# Patient Record
Sex: FEMALE | Race: WHITE | NOT HISPANIC OR LATINO | Employment: UNEMPLOYED | URBAN - METROPOLITAN AREA
[De-identification: names, ages, dates, MRNs, and addresses within clinical notes are randomized per-mention and may not be internally consistent; named-entity substitution may affect disease eponyms.]

---

## 2017-02-15 ENCOUNTER — LAB REQUISITION (OUTPATIENT)
Dept: LAB | Facility: HOSPITAL | Age: 37
End: 2017-02-15
Payer: COMMERCIAL

## 2017-02-15 ENCOUNTER — ALLSCRIPTS OFFICE VISIT (OUTPATIENT)
Dept: OTHER | Facility: OTHER | Age: 37
End: 2017-02-15

## 2017-02-15 DIAGNOSIS — J02.9 ACUTE PHARYNGITIS: ICD-10-CM

## 2017-02-15 LAB — S PYO AG THROAT QL: NEGATIVE

## 2017-02-15 PROCEDURE — 87430 STREP A AG IA: CPT | Performed by: FAMILY MEDICINE

## 2017-02-15 PROCEDURE — 87070 CULTURE OTHR SPECIMN AEROBIC: CPT | Performed by: FAMILY MEDICINE

## 2017-02-17 LAB — BACTERIA THROAT CULT: NORMAL

## 2017-07-12 ENCOUNTER — ALLSCRIPTS OFFICE VISIT (OUTPATIENT)
Dept: OTHER | Facility: OTHER | Age: 37
End: 2017-07-12

## 2018-01-12 VITALS
OXYGEN SATURATION: 98 % | DIASTOLIC BLOOD PRESSURE: 88 MMHG | WEIGHT: 126 LBS | SYSTOLIC BLOOD PRESSURE: 121 MMHG | HEART RATE: 81 BPM | BODY MASS INDEX: 24.74 KG/M2 | RESPIRATION RATE: 18 BRPM | TEMPERATURE: 98.9 F | HEIGHT: 60 IN

## 2018-01-14 VITALS
BODY MASS INDEX: 24.54 KG/M2 | HEIGHT: 60 IN | RESPIRATION RATE: 18 BRPM | OXYGEN SATURATION: 99 % | WEIGHT: 125 LBS | HEART RATE: 92 BPM | TEMPERATURE: 98.4 F | SYSTOLIC BLOOD PRESSURE: 108 MMHG | DIASTOLIC BLOOD PRESSURE: 76 MMHG

## 2018-05-18 ENCOUNTER — OFFICE VISIT (OUTPATIENT)
Dept: URGENT CARE | Facility: CLINIC | Age: 38
End: 2018-05-18
Payer: COMMERCIAL

## 2018-05-18 VITALS
OXYGEN SATURATION: 98 % | SYSTOLIC BLOOD PRESSURE: 98 MMHG | RESPIRATION RATE: 16 BRPM | WEIGHT: 119.8 LBS | HEIGHT: 61 IN | BODY MASS INDEX: 22.62 KG/M2 | TEMPERATURE: 97 F | DIASTOLIC BLOOD PRESSURE: 60 MMHG | HEART RATE: 76 BPM

## 2018-05-18 DIAGNOSIS — J02.9 SORE THROAT: ICD-10-CM

## 2018-05-18 DIAGNOSIS — J01.00 ACUTE NON-RECURRENT MAXILLARY SINUSITIS: Primary | ICD-10-CM

## 2018-05-18 LAB — S PYO AG THROAT QL: NEGATIVE

## 2018-05-18 PROCEDURE — 99213 OFFICE O/P EST LOW 20 MIN: CPT | Performed by: FAMILY MEDICINE

## 2018-05-18 PROCEDURE — 87880 STREP A ASSAY W/OPTIC: CPT | Performed by: FAMILY MEDICINE

## 2018-05-18 RX ORDER — FLUTICASONE PROPIONATE 50 MCG
1 SPRAY, SUSPENSION (ML) NASAL DAILY
Qty: 1 BOTTLE | Refills: 0 | Status: SHIPPED | OUTPATIENT
Start: 2018-05-18 | End: 2022-08-06 | Stop reason: ALTCHOICE

## 2018-05-18 RX ORDER — AMOXICILLIN AND CLAVULANATE POTASSIUM 875; 125 MG/1; MG/1
1 TABLET, FILM COATED ORAL EVERY 12 HOURS SCHEDULED
Qty: 20 TABLET | Refills: 0 | Status: SHIPPED | OUTPATIENT
Start: 2018-05-18 | End: 2018-05-28

## 2018-05-18 NOTE — PATIENT INSTRUCTIONS
Acute Sinusitis  - rapid strep test is negative   - Augmentin prescribed, complete as directed   - Flonase nasal spray prescribed to help w/ sinus pain and pressure   - should rest and drink plenty of fluids   - take Tylenol or Motrin as needed   - run a humidifier at home  - if symptoms persist despite treatment or worsen, follow up w/ pcp for re-check

## 2018-05-18 NOTE — PROGRESS NOTES
[facility]        NAME: Ivan Mondragon is a 30 y o  female  : 1980    MRN: [id number]  DATE: May 18, 2018  TIME: 4:53 PM    Assessment and Plan   Acute non-recurrent maxillary sinusitis [J01 00]  1  Acute non-recurrent maxillary sinusitis  amoxicillin-clavulanate (AUGMENTIN) 875-125 mg per tablet    fluticasone (FLONASE) 50 mcg/act nasal spray   2  Sore throat  POCT rapid strepA    CANCELED: POCT rapid strepA         Patient Instructions     Patient Instructions   Acute Sinusitis  - rapid strep test is negative   - Augmentin prescribed, complete as directed   - Flonase nasal spray prescribed to help w/ sinus pain and pressure   - should rest and drink plenty of fluids   - take Tylenol or Motrin as needed   - run a humidifier at home  - if symptoms persist despite treatment or worsen, follow up w/ pcp for re-check       Follow up with PCP in 3-5 days  Proceed to  ER if symptoms worsen  Chief Complaint     Chief Complaint   Patient presents with    Sinusitis     Began Wednesday with sore throat  and temp 100 7 then 101 this morning  Now very painful sinuses  Medicated with Motrin 3 tabs without relief  History of Present Illness       29 yo female presents c/o sinus pain and pressure, sore throat, nasal congestion, nasal drainage, and fever x 3 days  Had a fever of 100 7 last night, and 101 1 this morning  No ear pain  No cough or SOB  No chest pain  No GI sx  No skin rashes  No known allergies  No recent travel  Has been taking Motrin as needed  Rapid strep test is negative  Review of Systems   Review of Systems   Constitutional:        As noted in HPI   HENT:        As noted in HPI   Eyes: Negative  Respiratory: Negative  Cardiovascular: Negative  Gastrointestinal: Negative  Musculoskeletal: Negative  Skin: Negative            Current Medications       Current Outpatient Prescriptions:     amoxicillin-clavulanate (AUGMENTIN) 875-125 mg per tablet, Take 1 tablet by mouth every 12 (twelve) hours for 10 days, Disp: 20 tablet, Rfl: 0    fluticasone (FLONASE) 50 mcg/act nasal spray, 1 spray into each nostril daily, Disp: 1 Bottle, Rfl: 0    Current Allergies     Allergies as of 05/18/2018    (No Known Allergies)            The following portions of the patient's history were reviewed and updated as appropriate: allergies, current medications, past family history, past medical history, past social history, past surgical history and problem list      History reviewed  No pertinent past medical history  Past Surgical History:   Procedure Laterality Date    BREAST SURGERY         Family History   Problem Relation Age of Onset    No Known Problems Mother     No Known Problems Father          Medications have been verified  Objective   BP 98/60   Pulse 76   Temp (!) 97 °F (36 1 °C)   Resp 16   Ht 5' 1" (1 549 m)   Wt 54 3 kg (119 lb 12 8 oz)   SpO2 98%   BMI 22 64 kg/m²        Physical Exam     Physical Exam   Constitutional: She is oriented to person, place, and time  Vital signs are normal  She appears well-developed and well-nourished  She is active and cooperative  Non-toxic appearance  She does not have a sickly appearance  She does not appear ill  No distress  HENT:   Head: Normocephalic and atraumatic  Right Ear: Tympanic membrane, external ear and ear canal normal    Left Ear: Tympanic membrane, external ear and ear canal normal    Nose: Rhinorrhea present  Right sinus exhibits maxillary sinus tenderness  Right sinus exhibits no frontal sinus tenderness  Left sinus exhibits maxillary sinus tenderness  Left sinus exhibits no frontal sinus tenderness  Mouth/Throat: Uvula is midline and mucous membranes are normal  Posterior oropharyngeal erythema present  No oropharyngeal exudate, posterior oropharyngeal edema or tonsillar abscesses  Eyes: Conjunctivae and EOM are normal  Pupils are equal, round, and reactive to light     Neck: Normal range of motion  Neck supple  Pulmonary/Chest: Effort normal and breath sounds normal    Lymphadenopathy:     She has no cervical adenopathy  Neurological: She is alert and oriented to person, place, and time  Skin: She is not diaphoretic  Psychiatric: She has a normal mood and affect  Her behavior is normal  Judgment and thought content normal    Nursing note and vitals reviewed

## 2022-08-06 ENCOUNTER — OFFICE VISIT (OUTPATIENT)
Dept: URGENT CARE | Facility: CLINIC | Age: 42
End: 2022-08-06
Payer: COMMERCIAL

## 2022-08-06 VITALS — TEMPERATURE: 98 F | HEART RATE: 78 BPM | OXYGEN SATURATION: 98 % | RESPIRATION RATE: 14 BRPM

## 2022-08-06 DIAGNOSIS — R39.9 UTI SYMPTOMS: Primary | ICD-10-CM

## 2022-08-06 LAB
SL AMB  POCT GLUCOSE, UA: ABNORMAL
SL AMB LEUKOCYTE ESTERASE,UA: ABNORMAL
SL AMB POCT BILIRUBIN,UA: ABNORMAL
SL AMB POCT BLOOD,UA: ABNORMAL
SL AMB POCT CLARITY,UA: ABNORMAL
SL AMB POCT COLOR,UA: YELLOW
SL AMB POCT KETONES,UA: ABNORMAL
SL AMB POCT NITRITE,UA: ABNORMAL
SL AMB POCT PH,UA: 7.5
SL AMB POCT SPECIFIC GRAVITY,UA: 1
SL AMB POCT URINE PROTEIN: ABNORMAL
SL AMB POCT UROBILINOGEN: 0.2

## 2022-08-06 PROCEDURE — 81002 URINALYSIS NONAUTO W/O SCOPE: CPT | Performed by: PHYSICIAN ASSISTANT

## 2022-08-06 PROCEDURE — 87086 URINE CULTURE/COLONY COUNT: CPT | Performed by: PHYSICIAN ASSISTANT

## 2022-08-06 PROCEDURE — 87186 SC STD MICRODIL/AGAR DIL: CPT | Performed by: PHYSICIAN ASSISTANT

## 2022-08-06 PROCEDURE — 87077 CULTURE AEROBIC IDENTIFY: CPT | Performed by: PHYSICIAN ASSISTANT

## 2022-08-06 PROCEDURE — 99213 OFFICE O/P EST LOW 20 MIN: CPT | Performed by: PHYSICIAN ASSISTANT

## 2022-08-06 RX ORDER — NITROFURANTOIN 25; 75 MG/1; MG/1
100 CAPSULE ORAL 2 TIMES DAILY
Qty: 14 CAPSULE | Refills: 0 | Status: SHIPPED | OUTPATIENT
Start: 2022-08-06 | End: 2022-08-13

## 2022-08-06 NOTE — PATIENT INSTRUCTIONS
Take antibiotic as prescribed for suspected UTI, will send urine for culture  Recommend OTC ibuprofen/tylenol as needed for discomfort  Follow up with PCP  Return or be seen in ER with any worsening symptoms  Patient understands and is agreeable with this plan

## 2022-08-06 NOTE — PROGRESS NOTES
[facility]        NAME: Monica Grande is a 46 y o  female  : 1980    MRN: [id number]  DATE: 2022  TIME: 12:18 PM    Assessment and Plan   UTI symptoms [R39 9]  1  UTI symptoms  POCT urine dip    Urine culture    nitrofurantoin (MACROBID) 100 mg capsule         Patient Instructions   Patient Instructions   Take antibiotic as prescribed for suspected UTI, will send urine for culture  Recommend OTC ibuprofen/tylenol as needed for discomfort  Follow up with PCP  Return or be seen in ER with any worsening symptoms  Patient understands and is agreeable with this plan  Follow up with PCP in 3-5 days  Proceed to  ER if symptoms worsen  Chief Complaint     Chief Complaint   Patient presents with    Possible UTI     Pt presents with urinary urgency, painful urination; started two days ago; Hx of kidney stones, UTI         History of Present Illness       Patient is a 81-year-old female presenting today with UTI symptoms times 2 days  Patient notes a couple days ago she was experiencing some dysuria, notes she is now also experiencing increased frequency urination and sensation of urgency, believe she may have a UTI, has not taken any medications or alleviating factors for symptoms  Denies fever, chills, flank pain, hematuria, abdominal pain, vaginal discharge or drainage  Denies possibility of pregnancy  Review of Systems   Review of Systems   Constitutional: Negative for chills and fever  HENT: Negative for sore throat  Respiratory: Negative for cough  Cardiovascular: Negative for chest pain  Gastrointestinal: Negative for abdominal pain, diarrhea, nausea and vomiting  Genitourinary: Positive for dysuria, frequency and urgency  Negative for flank pain and hematuria  Musculoskeletal: Negative for arthralgias and myalgias  Skin: Negative for rash  Neurological: Negative for headaches           Current Medications       Current Outpatient Medications:    nitrofurantoin (MACROBID) 100 mg capsule, Take 1 capsule (100 mg total) by mouth 2 (two) times a day for 7 days, Disp: 14 capsule, Rfl: 0    Current Allergies     Allergies as of 08/06/2022 - Reviewed 08/06/2022   Allergen Reaction Noted    Ciprofloxacin Rash 03/02/2022            The following portions of the patient's history were reviewed and updated as appropriate: allergies, current medications, past family history, past medical history, past social history, past surgical history and problem list      History reviewed  No pertinent past medical history  Past Surgical History:   Procedure Laterality Date    BREAST SURGERY      NEPHROSTOMY TRACT DILATATION W/ LITHOTRIPSY Left        Family History   Problem Relation Age of Onset    No Known Problems Mother     No Known Problems Father          Medications have been verified  Objective   Pulse 78   Temp 98 °F (36 7 °C)   Resp 14   LMP 07/25/2022   SpO2 98%   Breastfeeding No        Physical Exam     Physical Exam  Vitals reviewed  Constitutional:       Appearance: Normal appearance  HENT:      Head: Normocephalic and atraumatic  Right Ear: External ear normal       Left Ear: External ear normal    Eyes:      Conjunctiva/sclera: Conjunctivae normal    Cardiovascular:      Rate and Rhythm: Normal rate and regular rhythm  Pulses: Normal pulses  Heart sounds: Normal heart sounds  Pulmonary:      Effort: Pulmonary effort is normal       Breath sounds: Normal breath sounds  Abdominal:      General: Abdomen is flat  Bowel sounds are normal       Palpations: Abdomen is soft  Tenderness: There is no abdominal tenderness  There is no right CVA tenderness or left CVA tenderness  Skin:     General: Skin is warm  Capillary Refill: Capillary refill takes less than 2 seconds  Neurological:      General: No focal deficit present  Mental Status: She is alert and oriented to person, place, and time

## 2022-08-09 LAB
BACTERIA UR CULT: ABNORMAL
BACTERIA UR CULT: ABNORMAL

## 2023-10-08 ENCOUNTER — OFFICE VISIT (OUTPATIENT)
Dept: URGENT CARE | Facility: CLINIC | Age: 43
End: 2023-10-08
Payer: COMMERCIAL

## 2023-10-08 VITALS
HEART RATE: 72 BPM | RESPIRATION RATE: 18 BRPM | WEIGHT: 141 LBS | BODY MASS INDEX: 26.62 KG/M2 | TEMPERATURE: 96.2 F | OXYGEN SATURATION: 99 % | HEIGHT: 61 IN

## 2023-10-08 DIAGNOSIS — R30.0 DYSURIA: Primary | ICD-10-CM

## 2023-10-08 PROCEDURE — 99203 OFFICE O/P NEW LOW 30 MIN: CPT | Performed by: PHYSICIAN ASSISTANT

## 2023-10-08 PROCEDURE — 81002 URINALYSIS NONAUTO W/O SCOPE: CPT | Performed by: PHYSICIAN ASSISTANT

## 2023-10-08 PROCEDURE — 87086 URINE CULTURE/COLONY COUNT: CPT | Performed by: PHYSICIAN ASSISTANT

## 2023-10-08 RX ORDER — NITROFURANTOIN 25; 75 MG/1; MG/1
100 CAPSULE ORAL 2 TIMES DAILY
Qty: 10 CAPSULE | Refills: 0 | Status: SHIPPED | OUTPATIENT
Start: 2023-10-08 | End: 2023-10-13

## 2023-10-08 NOTE — PROGRESS NOTES
[facility]        NAME: Miguel Figueroa is a 38 y.o. female  : 1980    MRN: [id number]  DATE: 2023  TIME: 9:53 AM    Assessment and Plan   Dysuria [R30.0]  1. Dysuria  POCT urine dip    Urine culture    nitrofurantoin (MACROBID) 100 mg capsule        Push fluids. Discussed strict return to care precautions as well as red flag symptoms which should prompt immediate ED referral. Pt verbalized understanding and is in agreement with plan. Please follow up with your primary care provider within the next week. Please remember that your visit today was with an urgent care provider and should not replace follow up with your primary care provider for chronic medical issues or annual physicals. Patient Instructions       Follow up with PCP in 3-5 days. Proceed to  ER if symptoms worsen. Chief Complaint     Chief Complaint   Patient presents with   • Possible UTI     Frequency, burning, took pyridium last night         History of Present Illness       Pt is a(n) 38 y.o. female with pmh kidney stones who presents out of concern for a UTI. Dysuria: Yes  Urgency: Yes  Frequency: Yes  Hematuria: No  Cloudy/malodorous urine: No  Vaginal discharge/itching: No  Concerns for STD: No  Possibility of pregnancy: Denies  LMP: Patient's last menstrual period was 09/10/2023. Fever: No  Flank pain: No  Nausea/vomiting: No  Previous UTIs: Yes  Last UTI: about 1 yr ago  Took pyridium last night which did help. Review of Systems   Review of Systems   Constitutional: Negative for chills, diaphoresis and fever. Respiratory: Negative for shortness of breath. Cardiovascular: Negative for chest pain. Gastrointestinal: Negative for abdominal pain, nausea and vomiting. Genitourinary: Positive for dysuria, frequency and urgency. Negative for flank pain and hematuria. Musculoskeletal: Negative for back pain and myalgias. Psychiatric/Behavioral: Negative for confusion.          Current Medications       Current Outpatient Medications:   •  nitrofurantoin (MACROBID) 100 mg capsule, Take 1 capsule (100 mg total) by mouth 2 (two) times a day for 5 days, Disp: 10 capsule, Rfl: 0    Current Allergies     Allergies as of 10/08/2023 - Reviewed 10/08/2023   Allergen Reaction Noted   • Ciprofloxacin Rash 03/02/2022            The following portions of the patient's history were reviewed and updated as appropriate: allergies, current medications, past family history, past medical history, past social history, past surgical history and problem list.     History reviewed. No pertinent past medical history. Past Surgical History:   Procedure Laterality Date   • BREAST SURGERY     • NEPHROSTOMY TRACT DILATATION W/ LITHOTRIPSY Left        Family History   Problem Relation Age of Onset   • No Known Problems Mother    • No Known Problems Father          Medications have been verified. Objective   Pulse 72   Temp (!) 96.2 °F (35.7 °C)   Resp 18   Ht 5' 1" (1.549 m)   Wt 64 kg (141 lb)   LMP 09/10/2023   SpO2 99%   BMI 26.64 kg/m²        Physical Exam     Physical Exam  Vitals and nursing note reviewed. Constitutional:       General: She is not in acute distress. Appearance: Normal appearance. She is not ill-appearing. HENT:      Head: Normocephalic and atraumatic. Cardiovascular:      Rate and Rhythm: Normal rate and regular rhythm. Heart sounds: Normal heart sounds. Pulmonary:      Effort: Pulmonary effort is normal. No respiratory distress. Breath sounds: Normal breath sounds. No wheezing, rhonchi or rales. Abdominal:      General: Abdomen is flat. Palpations: Abdomen is soft. Tenderness: There is no abdominal tenderness. There is no right CVA tenderness, left CVA tenderness or guarding. Skin:     General: Skin is warm and dry. Capillary Refill: Capillary refill takes less than 2 seconds.    Neurological:      Mental Status: She is alert and oriented to person, place, and time.    Psychiatric:         Behavior: Behavior normal.

## 2023-10-09 LAB — BACTERIA UR CULT: NORMAL
